# Patient Record
Sex: MALE | Race: WHITE | HISPANIC OR LATINO | Employment: FULL TIME | URBAN - METROPOLITAN AREA
[De-identification: names, ages, dates, MRNs, and addresses within clinical notes are randomized per-mention and may not be internally consistent; named-entity substitution may affect disease eponyms.]

---

## 2017-08-23 ENCOUNTER — ALLSCRIPTS OFFICE VISIT (OUTPATIENT)
Dept: OTHER | Facility: OTHER | Age: 37
End: 2017-08-23

## 2017-08-23 DIAGNOSIS — E66.9 OBESITY: ICD-10-CM

## 2017-08-30 ENCOUNTER — APPOINTMENT (OUTPATIENT)
Dept: LAB | Facility: CLINIC | Age: 37
End: 2017-08-30

## 2017-08-30 DIAGNOSIS — E66.9 OBESITY: ICD-10-CM

## 2017-08-30 LAB
ALBUMIN SERPL BCP-MCNC: 3.9 G/DL (ref 3.5–5)
ALP SERPL-CCNC: 55 U/L (ref 46–116)
ALT SERPL W P-5'-P-CCNC: 42 U/L (ref 12–78)
ANION GAP SERPL CALCULATED.3IONS-SCNC: 8 MMOL/L (ref 4–13)
AST SERPL W P-5'-P-CCNC: 20 U/L (ref 5–45)
BILIRUB SERPL-MCNC: 0.49 MG/DL (ref 0.2–1)
BUN SERPL-MCNC: 14 MG/DL (ref 5–25)
CALCIUM SERPL-MCNC: 9 MG/DL (ref 8.3–10.1)
CHLORIDE SERPL-SCNC: 104 MMOL/L (ref 100–108)
CHOLEST SERPL-MCNC: 182 MG/DL (ref 50–200)
CO2 SERPL-SCNC: 27 MMOL/L (ref 21–32)
CREAT SERPL-MCNC: 0.84 MG/DL (ref 0.6–1.3)
GFR SERPL CREATININE-BSD FRML MDRD: 112 ML/MIN/1.73SQ M
GLUCOSE P FAST SERPL-MCNC: 93 MG/DL (ref 65–99)
HDLC SERPL-MCNC: 35 MG/DL (ref 40–60)
LDLC SERPL CALC-MCNC: 117 MG/DL (ref 0–100)
POTASSIUM SERPL-SCNC: 3.5 MMOL/L (ref 3.5–5.3)
PROT SERPL-MCNC: 7.5 G/DL (ref 6.4–8.2)
SODIUM SERPL-SCNC: 139 MMOL/L (ref 136–145)
TRIGL SERPL-MCNC: 152 MG/DL

## 2017-08-30 PROCEDURE — 36415 COLL VENOUS BLD VENIPUNCTURE: CPT

## 2017-08-30 PROCEDURE — 80053 COMPREHEN METABOLIC PANEL: CPT

## 2017-08-30 PROCEDURE — 80061 LIPID PANEL: CPT

## 2018-01-13 VITALS
HEIGHT: 66 IN | HEART RATE: 78 BPM | BODY MASS INDEX: 30.4 KG/M2 | WEIGHT: 189.15 LBS | DIASTOLIC BLOOD PRESSURE: 80 MMHG | SYSTOLIC BLOOD PRESSURE: 106 MMHG | TEMPERATURE: 97.6 F

## 2018-04-03 RX ORDER — METRONIDAZOLE 10 MG/G
GEL TOPICAL 2 TIMES DAILY
COMMUNITY
Start: 2017-08-23 | End: 2021-01-02

## 2018-04-04 ENCOUNTER — OFFICE VISIT (OUTPATIENT)
Dept: INTERNAL MEDICINE CLINIC | Facility: CLINIC | Age: 38
End: 2018-04-04

## 2018-04-04 VITALS
TEMPERATURE: 97.8 F | SYSTOLIC BLOOD PRESSURE: 112 MMHG | HEIGHT: 66 IN | HEART RATE: 68 BPM | WEIGHT: 182.98 LBS | DIASTOLIC BLOOD PRESSURE: 80 MMHG | BODY MASS INDEX: 29.41 KG/M2

## 2018-04-04 DIAGNOSIS — L71.8 ACNE ROSACEA, PAPULAR TYPE: Primary | ICD-10-CM

## 2018-04-04 DIAGNOSIS — F17.200 TOBACCO DEPENDENCE: Chronic | ICD-10-CM

## 2018-04-04 PROBLEM — L71.9 ROSACEA: Status: ACTIVE | Noted: 2017-08-23

## 2018-04-04 PROCEDURE — 99212 OFFICE O/P EST SF 10 MIN: CPT | Performed by: INTERNAL MEDICINE

## 2018-04-04 RX ORDER — DIAPER,BRIEF,INFANT-TODD,DISP
EACH MISCELLANEOUS 4 TIMES DAILY PRN
COMMUNITY
End: 2018-04-04 | Stop reason: ALTCHOICE

## 2018-04-04 NOTE — ASSESSMENT & PLAN NOTE
Continue with metronidazole cream for skin care  Work on stopping smoking to help skin care  May use sulfacetamide cream as well

## 2018-04-04 NOTE — PROGRESS NOTES
ASSESSMENT/PLAN:  Problem List Items Addressed This Visit        Musculoskeletal and Integument    Acne rosacea, papular type - Primary     Continue with metronidazole cream for skin care  Work on stopping smoking to help skin care  May use sulfacetamide cream as well              Other    Tobacco dependence (Chronic)    Relevant Medications    nicotine polacrilex (NICORETTE) 2 mg gum          Schedule a follow-up appointment as needed  CHIEF COMPLAINT: skin problems    HISTORY OF PRESENT ILLNESS:  Felicitas Reveles is a 40 y o  whom presents to clinic today for complaints of skin rash  He reports that he has been having this issue for the last 10 years, which seems to have started around the time he started smoking  He currently smokes about half of a pack per day  He reports that in august he was prescribed some metronidazole cream for his skin for which he takes on a daily basis, though he has seen some mild improvement in his skin, he does not feel as though he is completely better  He reports that he washes his face every day with water and when he showers he will use Head and Shoulders for his face  He denied any fever or chills, N/v, abd pain, sob, recent travel or recent infections  Review of Systems   Constitutional: Negative for activity change, appetite change, chills, diaphoresis, fatigue and fever  HENT: Negative for congestion  Eyes: Negative for discharge and itching  Respiratory: Negative for apnea, cough, choking, shortness of breath and wheezing  Cardiovascular: Negative for chest pain, palpitations and leg swelling  Gastrointestinal: Negative for abdominal distention, constipation, diarrhea, nausea and vomiting  Endocrine: Negative for cold intolerance and heat intolerance  Musculoskeletal: Negative for arthralgias, back pain and myalgias  Skin: Negative for color change, pallor, rash and wound  Allergic/Immunologic: Negative for environmental allergies and food allergies  Neurological: Negative for dizziness, seizures, numbness and headaches  Hematological: Negative for adenopathy  Psychiatric/Behavioral: Negative for agitation and confusion  OBJECTIVE:  Vitals:    04/04/18 1603   BP: 112/80   BP Location: Right arm   Patient Position: Sitting   Cuff Size: Adult   Pulse: 68   Temp: 97 8 °F (36 6 °C)   TempSrc: Oral   Weight: 83 kg (182 lb 15 7 oz)   Height: 5' 5 5" (1 664 m)     Physical Exam   Constitutional: He appears well-developed and well-nourished  HENT:   Head: Normocephalic and atraumatic  Eyes: Conjunctivae and EOM are normal  Pupils are equal, round, and reactive to light  Right eye exhibits no discharge  Left eye exhibits no discharge  Neck: Normal range of motion  Neck supple  No JVD present  Cardiovascular: Normal rate, regular rhythm, normal heart sounds and intact distal pulses  Exam reveals no gallop and no friction rub  No murmur heard  Pulmonary/Chest: Effort normal and breath sounds normal  No respiratory distress  He has no wheezes  Abdominal: Soft  Bowel sounds are normal  He exhibits no distension  There is no tenderness  Neurological: He is alert  No cranial nerve deficit  Coordination normal    Skin: Skin is warm and dry  Capillary refill takes less than 2 seconds  There is erythema  Psychiatric: He has a normal mood and affect  His behavior is normal    Vitals reviewed  Current Outpatient Prescriptions:     metroNIDAZOLE (METROGEL) 1 % gel, Apply topically Twice daily, Disp: , Rfl:     nicotine polacrilex (NICORETTE) 2 mg gum, Chew 1 each (2 mg total) as needed for smoking cessation, Disp: 100 each, Rfl: 1    History reviewed  No pertinent past medical history    Past Surgical History:   Procedure Laterality Date    NO PAST SURGERIES       Social History     Social History    Marital status: Single     Spouse name: N/A    Number of children: N/A    Years of education: N/A     Occupational History    Not on file      Social History Main Topics    Smoking status: Current Every Day Smoker     Packs/day: 0 50     Years: 10 00    Smokeless tobacco: Never Used    Alcohol use Yes      Comment: pt "holiday"    Drug use: No    Sexual activity: Yes     Partners: Female     Birth control/ protection: Condom Male     Other Topics Concern    Not on file     Social History Narrative    Daily caffeine consumption, 2-3 servings a day    Socially only     Family History   Problem Relation Age of Onset    Diabetes Mother        ==  MD Irina Ortiz 73 Internal Medicine PGY-2    [address] , [address] [address] [facility] [address], [address] [facility]  Office: [phone]  Fax: [phone]

## 2018-08-08 ENCOUNTER — OFFICE VISIT (OUTPATIENT)
Dept: INTERNAL MEDICINE CLINIC | Facility: CLINIC | Age: 38
End: 2018-08-08

## 2018-08-08 VITALS
HEIGHT: 65 IN | HEART RATE: 92 BPM | BODY MASS INDEX: 30.93 KG/M2 | DIASTOLIC BLOOD PRESSURE: 72 MMHG | SYSTOLIC BLOOD PRESSURE: 110 MMHG | WEIGHT: 185.63 LBS | TEMPERATURE: 97.8 F

## 2018-08-08 DIAGNOSIS — B34.9 ACUTE VIRAL SYNDROME: Primary | ICD-10-CM

## 2018-08-08 PROCEDURE — 99213 OFFICE O/P EST LOW 20 MIN: CPT | Performed by: INTERNAL MEDICINE

## 2018-08-08 RX ORDER — FLUTICASONE PROPIONATE 50 MCG
1 SPRAY, SUSPENSION (ML) NASAL DAILY
Qty: 16 G | Refills: 0 | Status: SHIPPED | OUTPATIENT
Start: 2018-08-08 | End: 2021-01-02

## 2018-08-08 NOTE — PATIENT INSTRUCTIONS
You may use the Flonase as directed  You may also try over-the-counter Sudafed  Continue taking Tylenol and Nyquil as needed  Remain hydrated  Rinosinusitis   CUIDADO AMBULATORIO:   La rinosinusitis (RS)  es la inflamación de perera nariz y senos paranasales  Por lo general, comienza ayaka un virus, frecuentemente ayaka un resfriado común  Los virus normalmente bush entre 7 y 8 días y no necesitan tratamiento  Cuando el virus no se mejora por sí solo, usted podría tener rinosinusitis bacteriana  [place] significa que ha comenzado a crecer bacteria dentro de brenda senos paranasales  La RS aguda dura menos de 4 semanas  La RS crónica dura 12 semanas o más  La RS recurrente es cuando usted tiene 4 o más episodios de rinosinusitis en 1 año  Brenda signos y síntomas  podrían empeorar cuando usted se acueste sobre perera espalda o trate de dormir  Puede presentar cualquiera de los siguientes signos o síntomas:  · Congestión nasal y pérdida del sentido del olfato     · Flujo nasal espeso con mucosidad amarilla o verenice     · Sensación de presión o dolor en perera dwain, o dolor de jorge     · Dolor en los dientes o mal aliento     · Dolor o presión en el oído     · Fiebre o tos     · Cansancio  Busque atención médica de inmediato si:   · Usted tiene visión doble o no ve  · Usted tiene el judy rígido, fiebre o un abdiaziz dolor de jorge  · Perera globo ocular sobresale o usted no puede  perera yobany  · Perera yobany y párpado están enrojecidos, inflamados y adoloridos  · Usted no puede abrir perera yobany  · Usted tiene más sueño de lo normal o nota cambios en perera habilidad de pensar, moverse o hablar  · Usted tiene inflamada la frente o el cuero cabelludo  Pregúntele a perera Marvie Backers vitaminas y minerales son adecuados para usted  · Brenda síntomas empeoran o no mejoran después de 3 a 5 días de [place]  · Usted tiene preguntas o inquietudes acerca de perera condición o cuidado    El tratamiento para la rinosinusitis  podría incluir cualquiera de los siguientes:  · El acetaminofén  Kissousa el dolor y baja la fiebre  Está disponible sin receta médica  Pregunte la cantidad y la frecuencia con que debe tomarlos  [address]  El acetaminofén puede causar daño en el hígado cuando no se ml de forma correcta  · AINEs (Analgésicos antiinflamatorios no esteroides) ayaka el ibuprofeno, ayudan a disminuir la inflamación, el dolor y la [place]  Katy medicamento esta disponible con o sin maryam receta médica  Los AINEs pueden causar sangrado estomacal o problemas renales en ciertas personas  Si usted ml un medicamento anticoagulante, siempre pregúntele a almanzar médico si los JEISON son seguros para usted  Siempre isabel la etiqueta de katy medicamento y [place] instrucciones  · Los aerosoles nasales con esteroides  disminuyen la inflamación de almanzar nariz y de los senos paranasales  · Los descongestionantes  reducen la inflamación y despejan la mucosidad de la nariz y los senos paranasales  Los descongestionantes podrían ayudarle a respirar más fácilmente  · Los antihistamínicos  secar la mucosidad en almanzar nariz y [employer] estornudos  · Antibióticos  sirven para tratar maryam infección bacteriana y se podrían necesitar si los síntomas no mejoran o se [place]  · Arjay jas medicamentos ayaka se le haya indicado  Consulte con almanzar médico si usted rigoberto que almanzar medicamento no le está ayudando o si presenta efectos secundarios  Infórmele si es alérgico a cualquier medicamento  Mantenga maryam lista actualizada de los [place], las vitaminas y los productos herbales que ml  Incluya los siguientes datos de los medicamentos: cantidad, frecuencia y motivo de administración  Traiga con usted la lista o los envases de la píldoras a jas citas de seguimiento  Lleve la lista de los medicamentos con usted en cynthia de maryam emergencia  Cuidados personales:   · Enjuague jas senos paranasales    Use un aparato para enjuagar jas fosas nasales con [facility] solución salina (agua salada)  [place] ayudará a diluir la mucosidad en la nariz eliminando el polen y la suciedad  También ayudará a reducir la inflamación para que usted pueda respirar normalmente  Pregunte a almanzar médico con qué frecuencia hacerlo  · Respire vapor  [place] agua en un sartén hasta que salga vapor  Inclínese sobre el cuenco y gavino maryam carpa con maryam toalla dirk  Respire profundamente por aproximadamente 20 minutos  Tenga cuidado de no acercarse demasiado al vapor o de quemarse  Gavino esto 3 veces al día  Usted también puede respirar profundamente mientras ml maryam ducha caliente  · Duerma con la Mardelle Hector  Coloque maryam almohada adicional debajo de almanzar jorge antes de dormir para ayudar a drenar jas senos paranasales  · [address] se le haya indicado  Pregunte a almanzar médico sobre la cantidad de líquido que necesita robert todos los días y cuáles le recomienda  Los líquidos van a diluir la mucosidad en almanzar [facility] y [facility] a drenarla  Evite las bebidas que contienen alcohol o cafeína  · No fume y evite el humo de [place]  La nicotina y otros químicos en los cigarrillos y cigarros pueden empeorar jas síntomas  Pida información a almanzar médico si usted actualmente fuma y necesita ayuda para dejar de fumar  Los cigarrillos electrónicos o tabaco sin humo todavía contienen nicotina  Consulte con almanzar médico antes de [employer]  Acuda a jas consultas de control con almanzar médico según le indicaron  Acuda a maryam luis eduardo seguimiento si jas síntomas empeoran o no se mejoran al cabo de 3 a 5 días de tratamiento  Anote jas preguntas para que se acuerde de hacerlas arminda jas visitas  © 2017 [address] [employer] Information is for End User's use only and may not be sold, redistributed or otherwise used for commercial purposes  All illustrations and images included in CareNotes® are the copyrighted property of [employer]AMES [employer] M , Inc  or Akbar Shields    Esta información es sólo para uso en educación  Almanzar intención no es darle un consejo médico sobre enfermedades o tratamientos  Colsulte con almanzar [employer] farmacéutico antes de seguir cualquier régimen médico para saber si es seguro y efectivo para usted

## 2018-08-08 NOTE — PROGRESS NOTES
ACUTE VISIT NOTE   Gary Campbell   45 y o  male   MRN: [id number]    ASSESSMENT/PLAN:  Diagnoses and all orders for this visit:    1  Acute viral syndrome  · Continue supportive care  · Will trials flonase for congestive symptoms  · Advised patient to also try OTC Sudafed  · Continue Tylenol and Nyquil as needed  · Patient advised to remain hydrated  · Return to clinic as needed for persistent or worsening symptoms  · Fluticasone (FLONASE) 50 mcg/act nasal spray; 1 spray into each nostril daily      Schedule a follow-up appointment as needed if symptoms worsen  Chief Complaint: Flu-like symptoms    History of Present Illness:  45year old male presents to office with acute complaint of flu-like symptoms  Started 3 days ago  Consists of rhinorrhea, sinus congestion, sore throat, cough intermittently productive of clear sputum, subjective fevers  Patient is afebrile in office today  Has tried taking Nyquil without significant relief  Denies any sick contacts  Has not had flu-shot this year  Patient works in [place]  Denies any recent travel  Review of Systems   Constitutional: Positive for fever  HENT: Positive for rhinorrhea, sinus pain, sinus pressure and sore throat  Respiratory: Positive for cough  All other systems reviewed and are negative  Objective:  Vitals:    08/08/18 1633   BP: 110/72   BP Location: Right arm   Patient Position: Sitting   Cuff Size: Adult   Pulse: 92   Temp: 97 8 °F (36 6 °C)   TempSrc: Oral   Weight: 84 2 kg (185 lb 10 oz)   Height: 5' 5" (1 651 m)     Physical Exam   Constitutional: He is oriented to person, place, and time  He appears well-developed and well-nourished  No distress  HENT:   Head: Normocephalic and atraumatic  Nose: Rhinorrhea present  Mouth/Throat: Oropharynx is clear and moist    Eyes: Conjunctivae and EOM are normal  No scleral icterus  Neck: Neck supple  No JVD present  No tracheal deviation present     Cardiovascular: Normal rate, regular rhythm, normal heart sounds and intact distal pulses  Exam reveals no gallop and no friction rub  No murmur heard  Pulmonary/Chest: Effort normal and breath sounds normal  No respiratory distress  He has no wheezes  He has no rales  He exhibits no tenderness  Abdominal: Soft  Bowel sounds are normal  He exhibits no distension  There is no tenderness  There is no rebound and no guarding  Musculoskeletal: Normal range of motion  He exhibits no edema or deformity  Lymphadenopathy:     He has cervical adenopathy  Neurological: He is alert and oriented to person, place, and time  No cranial nerve deficit  Skin: Skin is warm and dry  No rash noted  He is not diaphoretic  No erythema  No pallor  Nursing note and vitals reviewed  Current Outpatient Prescriptions:     metroNIDAZOLE (METROGEL) 1 % gel, Apply topically Twice daily, Disp: , Rfl:     fluticasone (FLONASE) 50 mcg/act nasal spray, 1 spray into each nostril daily, Disp: 16 g, Rfl: 0    nicotine polacrilex (NICORETTE) 2 mg gum, Chew 1 each (2 mg total) as needed for smoking cessation, Disp: 100 each, Rfl: 1    History reviewed  No pertinent past medical history  Past Surgical History:   Procedure Laterality Date    NO PAST SURGERIES       Social History     Social History    Marital status: Single     Spouse name: N/A    Number of children: N/A    Years of education: N/A     Occupational History    Not on file       Social History Main Topics    Smoking status: Current Every Day Smoker     Packs/day: 0 50     Years: 10 00    Smokeless tobacco: Never Used    Alcohol use Yes      Comment: pt "holiday"    Drug use: No    Sexual activity: Yes     Partners: Female     Birth control/ protection: Condom Male     Other Topics Concern    Not on file     Social History Narrative    Daily caffeine consumption, 2-3 servings a day    Socially only     Family History   Problem Relation Age of Onset    Diabetes Mother        ==  Isidro Felipe Bill Smith 15 Internal Medicine PGY-2    [address] [facility] , [facility] [id number] [place] [address] 28, 210 [facility]  Office: [phone]  Fax: [phone]

## 2019-01-30 ENCOUNTER — OFFICE VISIT (OUTPATIENT)
Dept: INTERNAL MEDICINE CLINIC | Facility: CLINIC | Age: 39
End: 2019-01-30

## 2019-01-30 VITALS
BODY MASS INDEX: 31.22 KG/M2 | TEMPERATURE: 98.2 F | SYSTOLIC BLOOD PRESSURE: 134 MMHG | HEIGHT: 65 IN | DIASTOLIC BLOOD PRESSURE: 80 MMHG | WEIGHT: 187.39 LBS | HEART RATE: 96 BPM

## 2019-01-30 DIAGNOSIS — N48.89 NODULE OF SHAFT OF PENIS: Primary | ICD-10-CM

## 2019-01-30 PROCEDURE — 99213 OFFICE O/P EST LOW 20 MIN: CPT | Performed by: INTERNAL MEDICINE

## 2019-01-30 NOTE — PROGRESS NOTES
ASSESSMENT/PLAN:  Problem List Items Addressed This Visit     None      Visit Diagnoses     Nodule of shaft of penis    -  Primary    small nodule noted below the head of the penis  WIll send to urology for eval           Schedule a follow-up appointment 6months as needed    CHIEF COMPLAINT: here for personal complaint    HISTORY OF PRESENT ILLNESS:  Asmita Peterson is a 45 y o  with no significant past medical history whom presents to clinic today for complaints of lesion on the side of his penis  He states that he has had a small nodule on the shaft of his penis for quite some time and the nodule has not gotten any bigger  He denied any drainage or puss from the nodule  He reports that he is sexually active with his girlfriend and that he is uncomfortable with erections because the nodule pulls the skin taught  He would like a urological evaluation  [employer] interpretor Shawn Dang ID# [id number] Assisted with translation throughout examination    Review of Systems   Constitutional: Negative for activity change, appetite change, chills, diaphoresis, fatigue and fever  Eyes: Negative for discharge and itching  Respiratory: Negative for cough, chest tightness, shortness of breath and wheezing  Cardiovascular: Negative for chest pain, palpitations and leg swelling  Gastrointestinal: Negative for abdominal distention, abdominal pain, constipation, diarrhea, nausea and vomiting  Endocrine: Negative for cold intolerance and heat intolerance  Musculoskeletal: Negative for arthralgias, back pain, gait problem and myalgias  Skin: Negative for color change, pallor, rash and wound  Allergic/Immunologic: Negative for environmental allergies and food allergies  Neurological: Negative for dizziness, weakness, light-headedness and headaches  Hematological: Negative for adenopathy  Psychiatric/Behavioral: Negative for agitation, behavioral problems, confusion, decreased concentration and dysphoric mood  OBJECTIVE:  Vitals:    01/30/19 1544   BP: 134/80   Pulse: 96   Temp: 98 2 °F (36 8 °C)   Weight: 85 kg (187 lb 6 3 oz)   Height: 5' 5" (1 651 m)     Physical Exam   Constitutional: He is oriented to person, place, and time  He appears well-developed and well-nourished  No distress  HENT:   Head: Normocephalic and atraumatic  Eyes: Pupils are equal, round, and reactive to light  Conjunctivae and EOM are normal  Right eye exhibits no discharge  Left eye exhibits no discharge  Neck: Normal range of motion  Neck supple  Cardiovascular: Normal rate, regular rhythm and intact distal pulses  Pulmonary/Chest: Effort normal and breath sounds normal    Abdominal: Soft  He exhibits no distension  Genitourinary: Circumcised  Genitourinary Comments: Small nodule noted at just below the head of the penis along the line of circumcision  No erythema, tenderness, fluctuance or warmth  Musculoskeletal: Normal range of motion  He exhibits no edema  Neurological: He is alert and oriented to person, place, and time  No cranial nerve deficit  Skin: Skin is warm and dry  Capillary refill takes less than 2 seconds  No rash noted  He is not diaphoretic  No erythema  No pallor  Psychiatric: He has a normal mood and affect  His behavior is normal    Vitals reviewed  Current Outpatient Prescriptions:     fluticasone (FLONASE) 50 mcg/act nasal spray, 1 spray into each nostril daily, Disp: 16 g, Rfl: 0    metroNIDAZOLE (METROGEL) 1 % gel, Apply topically Twice daily, Disp: , Rfl:     nicotine polacrilex (NICORETTE) 2 mg gum, Chew 1 each (2 mg total) as needed for smoking cessation (Patient not taking: Reported on 1/30/2019 ), Disp: 100 each, Rfl: 1    History reviewed  No pertinent past medical history    Past Surgical History:   Procedure Laterality Date    NO PAST SURGERIES       Social History     Social History    Marital status: Single     Spouse name: N/A    Number of children: N/A    Years of education: N/A     Occupational History    Not on file  Social History Main Topics    Smoking status: Light Tobacco Smoker     Packs/day: 0 50     Years: 10 00    Smokeless tobacco: Never Used    Alcohol use Yes      Comment: pt "holiday"    Drug use: No    Sexual activity: Yes     Partners: Female     Birth control/ protection: Condom Male     Other Topics Concern    Not on file     Social History Narrative    Daily caffeine consumption, 2-3 servings a day    Socially only     Family History   Problem Relation Age of Onset    Diabetes Mother        ==  MD Irina Sierra 73 Internal Medicine PGY-3    [facility]  [address] [address] [facility] , [address] [address] [facility] [address], [address] [facility]  Office: [phone]  Fax: [phone]

## 2019-01-30 NOTE — PATIENT INSTRUCTIONS
Dieta saludable para el corazón   LO QUE NECESITA SABER:   ¿Qué es maryam dieta saludable para Shayla Muskrat? Donnella Darner shireen para el corazón es un plan alimenticio bajo en grasas totales, grasas no saludables y sodio (mando)  Donnella Darner saludable para el corazón ayuda a disminuir almanzar riesgo de sufrir de enfermedades del [place] y un derrame cerebral  Limite la cantidad de grasa que consume entre un 25% a 35% del total de jas calorías diarias  Limite el sodio por debajo de los 2,300 mg al día  ¿Qué es la grasa saludable y dónde se encuentra? Las grasas saludables ayudan a mejorar los niveles de [place]  El riesgo de maryam enfermedad cardíaca se disminuye cuando los niveles de colesterol son normales  Escoja grasas saludables ayaka las siguientes:  · Las grasas no saturadas  se encuentran en alimentos ayaka el frijol de soja, aceites de [place], de [place], de [place] y de [place]  Se encuentra también en la margarina suave hecha con aceite líquido vegetal      · Las grasas Omega 3  se encuentran en ciertos pescados, ayaka el salmón, el atún y la [place], en las nueces y en la semilla de charlye  ¿Qué es maryam grasa perjudicial y dónde se encuentra? Las grasas "malas" pueden causar niveles perjudiciales de colesterol en almanzar gloria y aumentar el riesgo de maryam enfermedad cardíaca  Limite el consumo de grasas no saludables, ayaka los siguientes:  · El colesterol  se encuentra en alimentos de origen animal, ayaka los huevos y la langosta al igual que en productos lácteos hechos con leche entera  Limite el consumo de colesterol a menos de 300 milligramos (mg) al día  Es posible que necesite limitar el colesterol a 200 mg al día si sufre de maryam enfermedad cardíaca  · Las grasas saturadas  se encuentran en jerome ayaka el tocino y la hamburguesa  Estas se encuentran también en la piel del michel y del [place], [place] entera y [place]  Limite el consumo de grasas saturadas a menos del 7% del total de jas calorías diarias   [facility] saturadas a menos del 6% si usted tiene enfermedad cardíaca o tiene un mayor riesgo para esto  · La grasa trans  se encuentran en los alimentos envasados, ayaka las papitas de bolsa y las [place]  También se encuentra en la margarina dura, algunos alimentos fritos y la manteca  Evite lo más que [employer] trans  ¿Qué alimentos o bebidas puedo consumir en maryam dieta saludable para el corazón? Solicite que almanzar nutricionista o el médico le indique cuántas porciones necesita consumir de los siguientes alimentos de cada beatriz alimenticio:  · Granos:      ¨ Panes, cereales y pastas de [place] integral y Mearl Malady integral    ¨ Patatas fritas y galletas saladas bajas en grasa, bajas en sodio    · Verduras:      ¨ Brócoli, judías verdes, guisantes y espinacas    ¨ [place], col y habas    ¨ Zanahorias, patatas dulces, tomates y pimientos    ¨ Vegetales enlatados sin mando añadida    · Frutas:      ¨ Plátanos, melocotones, peras y richter    ¨ Uvas, pasas y dátiles    ¨ [place], [place], [facility], [facility] y [facility]    ¨ [place], [place], melón y papaya    ¨ Hawks y fres    ¨ Conservas de frutas sin azúcares añadidos    · Productos lácteos bajos en grasa:      ¨ Leche sin grasa (descremada), leche 1%, leche baja en grasa de Nancy, anacardo o leche de soja fortificada con calcio    ¨ Queso cottage, queso bajo en grasa y yogur regular o congelado    · Blanche y proteínas , feng de carne [place] de res o cerdo (ileana, nicole capps), el michel y el pavo sin piel, legumbres, productos de soya, las claras del huevo y nueces o maye secos  ¿Cuáles alimentos o bebidas necesito limitar o evitar?   Pregúntele a almanzar médico o nutricionista sobre estos y otros alimentos que contienen altos niveles de alfred Granda y Ramiro que no son saludables:  · [employer] , ayaka las comidas Gerardo Espinoza, robert con queso y cereales con más de 300 mg de sodio por porción    · Corvil o mezclas secas  para pasteles, sopas o salsas    · Verduras con sodio agregada , ayaka las kiran instantáneas, verduras con salsas agregadas o conservas regulares de verduras    · Otros alimentos altos en sodio , ayaka la salsa de [place], salsa de [facility], aderezo para [place], encurtidos de [place], UPPER STONE, salsa de soya y miso    · Alimentos lácteos con alto contenido de grasa  ayaka leche entera o 2%, queso crema o crema agria y quesos     · Alimentos con proteínas altas en grasa  feng de carne (ayaka las [place], las chuletas con hueso), el michel o pavo sin piel y las vísceras de animal ayaka el hígado    · Blanche curadas o [place] , ayaka las salchichas, el tocino y salchichones    · Aceites y grasas poco saludables , ayaka la [place], margarina en Espiridion Kress y aceites para cocinar ayaka el aceite de caitlyn o clement    · Alimentos y bebidas altas en azúcar , tales ayaka refrescos (gaseosas), bebidas deportivas, té azucarado, dulces, pasteles, galletas, tartas y donas  ¿Qué otras pautas para la dieta nura seguir? · Consuma más alimentos que contengan grasas Omega-3  Consuma pescado con altas cantidades de Omega-3 por lo menos 2 veces a la semana  · Limite el consumo de alcohol  Demasiado alcohol puede dañar almanzar corazón y elevar almanzar presión arterial  Las mujeres deberían limitar el consumo de alcohol a 1 bebida por día  Los hombres deberían limitar el consumo de alcohol a 2 tragos al día  Un trago equivale a 12 onzas de cerveza, 5 onzas de vino o 1 onza y ½ de licor  · Escoja alimentos bajos en sodio  Alimentos altos de sodio pueden conducir a la hipertensión  Al preparar la comida añada muy poca sal o no use sal  Use hierbas y especias en vez de la sal     · Consuma más fibra  para ayudar a bajar los niveles de [place]  Consuma al menos 5 porciones de frutas y verduras todos los días  Consuma 3 onzas de alimentos integrales al día  [employer] (henny) son [facility] buena idalia de Genesis    ¿Qué pautas de estilo de maciej debería seguir? · No fume  La nicotina y otros químicos contenidos en los cigarrillos y cigarros pueden causar daño a jas pulmones y el corazón  Pida información a rojo médico si usted actualmente fuma y necesita ayuda para dejar de fumar  Los cigarrillos electrónicos o tabaco sin humo todavía contienen nicotina  Consulte con rojo médico antes de [employer]  · Marisela Peña regularmente  para que le ayude a mantener un peso saludable y mejorar rojo presión arterial y niveles de colesterol  Pregunte a rojo médico acerca del mejor plan de ejercicio para usted  No empiece un programa de ejercicios sin antes consultar con rojo médico    ACUERDOS SOBRE ROJO CUIDADO:   Usted tiene el derecho de ayudar a planear rojo cuidado  Discuta jas opciones de tratamiento con jas médicos para decidir el cuidado que usted desea recibir  Usted siempre tiene el derecho de rechazar el tratamiento  Esta información es sólo para uso en educación  Rojo intención no es darle un consejo médico sobre enfermedades o tratamientos  Colsulte con rojo Freeda Newell farmacéutico antes de seguir cualquier régimen médico para saber si es seguro y efectivo para usted  © 2017 [employer] [employer]  Information is for End User's use only and may not be sold, redistributed or otherwise used for commercial purposes  All illustrations and images included in CareNotes® are the copyrighted property of A D A M , Inc  or Akbar Shields

## 2019-03-05 ENCOUNTER — CONSULT (OUTPATIENT)
Dept: MULTI SPECIALTY CLINIC | Facility: CLINIC | Age: 39
End: 2019-03-05

## 2019-03-05 VITALS
DIASTOLIC BLOOD PRESSURE: 72 MMHG | HEIGHT: 66 IN | BODY MASS INDEX: 29.9 KG/M2 | TEMPERATURE: 97.8 F | WEIGHT: 186.07 LBS | HEART RATE: 64 BPM | SYSTOLIC BLOOD PRESSURE: 104 MMHG

## 2019-03-05 DIAGNOSIS — N48.89 NODULE OF SHAFT OF PENIS: ICD-10-CM

## 2019-03-05 DIAGNOSIS — Z78.9 UNCIRCUMCISED MALE: Primary | ICD-10-CM

## 2019-03-05 DIAGNOSIS — N47.1 PHIMOSIS: ICD-10-CM

## 2019-03-05 PROCEDURE — 99214 OFFICE O/P EST MOD 30 MIN: CPT | Performed by: UROLOGY

## 2019-03-05 RX ORDER — SODIUM CHLORIDE 9 MG/ML
125 INJECTION, SOLUTION INTRAVENOUS CONTINUOUS
Status: CANCELLED | OUTPATIENT
Start: 2019-03-05

## 2019-03-05 NOTE — PROGRESS NOTES
Office note  Assessment:    uncircumcised male    Plan: Will schedule him for circumcision  Seen by Marylee Cirri  Procedure explained, questions answered, informed consent obtained    ______________________________________________________________________    Subjective: This is a the 71-year-old  male with no significant male past no medical history medical history is in clinic today is in clinic today scheduling circumcision     Patient is complaining of having issue with his foreskin  Patient states that his girlfriend is saying Ammon Solis is virgin and needs to have his foreskin removed   Patient is having sex with no issue with ejaculation  He was seen by Medicine in January 2019 for a small nodule below the head of penis  Patient denies any nodule, any ulcer, any STD  He is in the clinic for scheduling his circumcision  No history of phimosis or paraphimosis  Patient is Czech-speaking only, information obtained through in-person  as well as with language line  No medical issues at present, no urological issues      Vitals:  /72 (BP Location: Left arm, Patient Position: Sitting, Cuff Size: Adult)   Pulse 64   Temp 97 8 °F (36 6 °C) (Oral)   Ht 5' 6" (1 676 m)   Wt 84 4 kg (186 lb 1 1 oz)   BMI 30 03 kg/m²       Lab Results and Cultures:   CBC with diff: No results found for: WBC, HGB, HCT, MCV, PLT, ADJUSTEDWBC, MCH, MCHC, RDW, MPV, NRBC,   BMP/CMP:  Lab Results   Component Value Date    K 3 5 08/30/2017     08/30/2017    CO2 27 08/30/2017    BUN 14 08/30/2017    CREATININE 0 84 08/30/2017    CALCIUM 9 0 08/30/2017    AST 20 08/30/2017    ALT 42 08/30/2017    ALKPHOS 55 08/30/2017    EGFR 112 08/30/2017   ,   Lipid Panel: No results found for: CHOL,   Coags: No results found for: PT, PTT, INR,     Blood Culture: No results found for: BLOODCX,   Urinalysis: No results found for: Yarelis Comber, [address], [address], [address] [facility] [address], NITRITE, PROTEINUA, Reyna Sksophia, Murtis Finger,   Urine Culture: No results found for: URINECX,   Wound Culure: No results found for: WOUNDCULT    Medications:  Current Outpatient Medications on File Prior to Visit   Medication Sig Dispense Refill    fluticasone (FLONASE) 50 mcg/act nasal spray 1 spray into each nostril daily 16 g 0    metroNIDAZOLE (METROGEL) 1 % gel Apply topically Twice daily      nicotine polacrilex (NICORETTE) 2 mg gum Chew 1 each (2 mg total) as needed for smoking cessation (Patient not taking: Reported on 1/30/2019 ) 100 each 1     No current facility-administered medications on file prior to visit  Physical Exam:    General:   Alert, oriented to place person and time, cooperative, pleasant  CV:   Regular rate and rhythm  Respiratory:   Clear to auscultation bilaterally  Abdominal:    bowel sound intact, nontender, nondistended  Extremities:   Good range of motion  Genitalia:   Penis:   Retractable foreskin, no lesion, no discharge, no erythema, no phimosis noted    Neurologic:   Grossly intact as tested    Sudha Mcbride PA-C  3/5/2019

## 2019-03-12 ENCOUNTER — TELEPHONE (OUTPATIENT)
Dept: UROLOGY | Facility: AMBULATORY SURGERY CENTER | Age: 39
End: 2019-03-12

## 2019-03-12 NOTE — TELEPHONE ENCOUNTER
03/12/19 Called patient to check if he had an update on insurance to possibly schedule surgery and he said he would call me back at a later date   Darya Mills

## 2019-06-26 NOTE — TELEPHONE ENCOUNTER
06/26/19  Patient was called several times in the last 3 months, he has not returned any calls regarding scheduling surgery

## 2021-01-02 ENCOUNTER — HOSPITAL ENCOUNTER (EMERGENCY)
Facility: HOSPITAL | Age: 41
Discharge: HOME | End: 2021-01-02
Attending: EMERGENCY MEDICINE
Payer: OTHER GOVERNMENT

## 2021-01-02 VITALS
TEMPERATURE: 100.2 F | WEIGHT: 186.95 LBS | BODY MASS INDEX: 30.17 KG/M2 | RESPIRATION RATE: 18 BRPM | DIASTOLIC BLOOD PRESSURE: 85 MMHG | HEART RATE: 91 BPM | OXYGEN SATURATION: 98 % | SYSTOLIC BLOOD PRESSURE: 148 MMHG

## 2021-01-02 DIAGNOSIS — B34.9 VIRAL SYNDROME: ICD-10-CM

## 2021-01-02 DIAGNOSIS — M54.9 BACK PAIN: Primary | ICD-10-CM

## 2021-01-02 PROCEDURE — U0003 INFECTIOUS AGENT DETECTION BY NUCLEIC ACID (DNA OR RNA); SEVERE ACUTE RESPIRATORY SYNDROME CORONAVIRUS 2 (SARS-COV-2) (CORONAVIRUS DISEASE [COVID-19]), AMPLIFIED PROBE TECHNIQUE, MAKING USE OF HIGH THROUGHPUT TECHNOLOGIES AS DESCRIBED BY CMS-2020-01-R: HCPCS | Performed by: EMERGENCY MEDICINE

## 2021-01-02 PROCEDURE — 99284 EMERGENCY DEPT VISIT MOD MDM: CPT | Performed by: EMERGENCY MEDICINE

## 2021-01-02 PROCEDURE — 96372 THER/PROPH/DIAG INJ SC/IM: CPT

## 2021-01-02 PROCEDURE — 99283 EMERGENCY DEPT VISIT LOW MDM: CPT

## 2021-01-02 RX ORDER — KETOROLAC TROMETHAMINE 30 MG/ML
30 INJECTION, SOLUTION INTRAMUSCULAR; INTRAVENOUS ONCE
Status: COMPLETED | OUTPATIENT
Start: 2021-01-02 | End: 2021-01-02

## 2021-01-02 RX ORDER — LIDOCAINE 50 MG/G
1 PATCH TOPICAL ONCE
Status: DISCONTINUED | OUTPATIENT
Start: 2021-01-02 | End: 2021-01-02 | Stop reason: HOSPADM

## 2021-01-02 RX ADMIN — LIDOCAINE 1 PATCH: 50 PATCH TOPICAL at 14:10

## 2021-01-02 RX ADMIN — KETOROLAC TROMETHAMINE 30 MG: 30 INJECTION, SOLUTION INTRAMUSCULAR at 14:10

## 2021-01-02 NOTE — Clinical Note
Home Duran was seen and treated in our emergency department on 1/2/2021  Diagnosis:     Jesus Ford  may return to work on return date  He may return on this date: 01/06/2021         If you have any questions or concerns, please don't hesitate to call        Kaushik Salmeron MD    ______________________________           _______________          _______________  Hospital Representative                              Date                                Time

## 2021-01-02 NOTE — ED PROVIDER NOTES
Final Diagnosis:  1  Back pain    2  Viral syndrome        Chief Complaint   Patient presents with    Back Pain     Patient reports he lifted something heavy 3 days ago and started having back pain   Headache     Patient reports headache and fever starting 3 days ago as well  HPI  Patient presents for evaluation of back pain, headache, and fever  He states that 3 days ago he was at work where he pains and he lifted up a heavy box and started to have mid back pain  This has continued over the past 2-3 days  It is worse with movement  He has taken Tylenol with minor improvement of his pain  He states that he has had back pain like this before that just eventually self resolved  He denies any IV drug use, trauma to his back, bladder bowel incontinence, saddle anesthesia, difficulty with ambulation  Patient also states that he has had a minor headache as well as occasional fever during this time  He is unsure of any sick contacts  Endorses occasional cough which is nonproductive  Occasionally smokes  No other drug use  - No language barrier    - History obtained from patient  - There are no limitations to the history obtained  - Previous charting was reviewed    PMH:   has no past medical history on file  PSH:   has a past surgical history that includes No past surgeries  ROS:  Review of Systems   Constitutional: Positive for fever  Negative for activity change, chills and fatigue  Respiratory: Positive for cough  Negative for shortness of breath  Cardiovascular: Negative for chest pain and palpitations  Gastrointestinal: Negative for abdominal distention, abdominal pain, constipation, diarrhea, nausea and vomiting  Genitourinary: Negative for dysuria and hematuria  Musculoskeletal: Positive for back pain  Negative for arthralgias, myalgias and neck pain  Neurological: Positive for headaches  Negative for dizziness, syncope and light-headedness     All other systems reviewed and are negative  PE:   Vitals:    01/02/21 1351   BP: 148/85   BP Location: Right arm   Pulse: 91   Resp: 18   Temp: 100 2 °F (37 9 °C)   TempSrc: Oral   SpO2: 98%   Weight: 84 8 kg (186 lb 15 2 oz)     Vitals reviewed by me  Physical Exam  Vitals signs reviewed  Constitutional:       General: He is not in acute distress  Appearance: He is well-developed  He is not diaphoretic  HENT:      Head: Normocephalic and atraumatic  Right Ear: External ear normal       Left Ear: External ear normal    Eyes:      General:         Right eye: No discharge  Left eye: No discharge  Conjunctiva/sclera: Conjunctivae normal       Pupils: Pupils are equal, round, and reactive to light  Neck:      Musculoskeletal: Normal range of motion and neck supple  Vascular: No JVD  Trachea: No tracheal deviation  Cardiovascular:      Rate and Rhythm: Normal rate and regular rhythm  Heart sounds: Normal heart sounds  No murmur  No friction rub  No gallop  Pulmonary:      Effort: Pulmonary effort is normal  No respiratory distress  Breath sounds: Normal breath sounds  No wheezing or rales  Abdominal:      General: Bowel sounds are normal  There is no distension  Palpations: Abdomen is soft  There is no mass  Tenderness: There is no abdominal tenderness  There is no guarding  Musculoskeletal: Normal range of motion  General: Tenderness present  No deformity  Thoracic back: He exhibits tenderness  He exhibits normal range of motion, no bony tenderness, no deformity and no spasm  Back:       Comments: No midline tenderness, step-offs, or deformities  Mild paraspinal tenderness with palpation  Neurological:      Mental Status: He is alert and oriented to person, place, and time  Cranial Nerves: No cranial nerve deficit  Sensory: No sensory deficit  Motor: No abnormal muscle tone        Coordination: Coordination normal  Comments: GCS 15  Sensation grossly intact  No cranial nerve deficits noted  5/5 strength bilateral upper and lower extremities  Finger-to-nose good  Heel to shin good  No pronator drift noted  Was able to ambulate without difficulty  Psychiatric:         Behavior: Behavior normal          Thought Content: Thought content normal          Judgment: Judgment normal           A:  - Nursing note reviewed  -                      No orders to display     Orders Placed This Encounter   Procedures    Novel Coronavirus ([id number]), PCR [facility]     Labs Reviewed - No data to display      Final Diagnosis:  1  Back pain    2  Viral syndrome        P:  - Toradol, Lidoderm, coronavirus testing  -instructed to stay home until receiving results from coronavirus testing  -return precautions discussed    Medications   lidocaine (LIDODERM) 5 % patch 1 patch (1 patch Topical Medication Applied 1/2/21 1410)   ketorolac (TORADOL) injection 30 mg (30 mg Intramuscular Given 1/2/21 1410)     Time reflects when diagnosis was documented in both MDM as applicable and the Disposition within this note     Time User Action Codes Description Comment    1/2/2021  2:04 PM Isidro Rushing Add [M54 9] Back pain     1/2/2021  2:04 PM Isidro Rushing Add [B34 9] Viral syndrome       ED Disposition     ED Disposition Condition Date/Time Comment    Discharge Stable Sat Jan 2, 2021  2:04 PM Marycruz Stacy discharge to home/self care              Follow-up Information     Follow up With Specialties Details Why Contact Info Additional MD Ian [address] [facility] [address]  [phone]       [address] [place] [address] Emergency Department Emergency Medicine  If symptoms worsen [address]  [phone]  ED, [address] [place] [address], [address]   [phone]        Patient's Medications   Discharge Prescriptions    No medications on file     No discharge procedures on file  None       Portions of the record may have been created with voice recognition software  Occasional wrong word or "sound a like" substitutions may have occurred due to the inherent limitations of voice recognition software  Read the chart carefully and recognize, using context, where substitutions have occurred      Electronically signed by:  Torres Del Angel, PGY 3, MD Litzy Cintron MD  01/02/21 9643

## 2021-01-02 NOTE — ED ATTENDING ATTESTATION
1/2/2021  IDanay MD, saw and evaluated the patient  I have discussed the patient with the resident/non-physician practitioner and agree with the resident's/non-physician practitioner's findings, Plan of Care, and MDM as documented in the resident's/non-physician practitioner's note, except where noted  All available labs and Radiology studies were reviewed  I was present for key portions of any procedure(s) performed by the resident/non-physician practitioner and I was immediately available to provide assistance  At this point I agree with the current assessment done in the Emergency Department  I have conducted an independent evaluation of this patient a history and physical is as follows:    ED Course         Critical Care Time  Procedures    35 yo male with low back pain started after lifting something few days ago  Pt with no numbness, tingling, weakness into legs  Pt with hx of similar pain, no pain meds at home  Pt with no trouble ambulating  Pt also concerned for covid and having low grade fevers, no cough, mild headache  No other pmh  Vss, afebrile, lungs cta, rrr, abdomen soft nontender, paraspinal lumbar tenderness  msk pain, pain meds, covid swab

## 2021-01-04 LAB — SARS-COV-2 RNA SPEC QL NAA+PROBE: DETECTED

## 2021-01-05 ENCOUNTER — HOSPITAL ENCOUNTER (EMERGENCY)
Facility: HOSPITAL | Age: 41
Discharge: HOME | End: 2021-01-05
Attending: EMERGENCY MEDICINE | Admitting: EMERGENCY MEDICINE
Payer: OTHER GOVERNMENT

## 2021-01-05 VITALS
RESPIRATION RATE: 20 BRPM | TEMPERATURE: 98.4 F | WEIGHT: 187.83 LBS | OXYGEN SATURATION: 97 % | BODY MASS INDEX: 30.32 KG/M2 | DIASTOLIC BLOOD PRESSURE: 66 MMHG | HEART RATE: 87 BPM | SYSTOLIC BLOOD PRESSURE: 160 MMHG

## 2021-01-05 DIAGNOSIS — M54.9 BACK PAIN: ICD-10-CM

## 2021-01-05 DIAGNOSIS — U07.1 COVID-19: Primary | ICD-10-CM

## 2021-01-05 DIAGNOSIS — R52 BODY ACHES: ICD-10-CM

## 2021-01-05 PROCEDURE — 96374 THER/PROPH/DIAG INJ IV PUSH: CPT

## 2021-01-05 PROCEDURE — 99284 EMERGENCY DEPT VISIT MOD MDM: CPT | Performed by: PHYSICIAN ASSISTANT

## 2021-01-05 PROCEDURE — 99283 EMERGENCY DEPT VISIT LOW MDM: CPT

## 2021-01-05 RX ORDER — IBUPROFEN 600 MG/1
600 TABLET ORAL EVERY 6 HOURS PRN
Qty: 30 TABLET | Refills: 0 | Status: SHIPPED | OUTPATIENT
Start: 2021-01-05

## 2021-01-05 RX ORDER — KETOROLAC TROMETHAMINE 30 MG/ML
15 INJECTION, SOLUTION INTRAMUSCULAR; INTRAVENOUS ONCE
Status: COMPLETED | OUTPATIENT
Start: 2021-01-05 | End: 2021-01-05

## 2021-01-05 RX ORDER — ACETAMINOPHEN 500 MG
500 TABLET ORAL EVERY 6 HOURS PRN
Qty: 30 TABLET | Refills: 0 | Status: SHIPPED | OUTPATIENT
Start: 2021-01-05

## 2021-01-05 RX ADMIN — KETOROLAC TROMETHAMINE 15 MG: 30 INJECTION, SOLUTION INTRAMUSCULAR at 09:53

## 2021-01-05 NOTE — DISCHARGE INSTRUCTIONS
Please refer to the attached information for strict return instructions  If symptoms worsen or new symptoms develop please return to the ER  Drink plenty of water to stay hydrated  Please follow up with your primary care physician  You must quarantine at home until at least 3 days (72 hours) after symptoms improve  Use prescribed medications as indicated if needed for fever

## 2021-01-05 NOTE — ED PROVIDER NOTES
History  Chief Complaint   Patient presents with    Fever - 9 weeks to 74 years     pt reports headache and fever that started 6 days ago;      Felix Reid is a 37 yo M, with diagnosis of COVID-19 on 1/2/21, presenting with persistent fevers, body aches, and headache since symptom onset 6 days ago  Reports symptoms are unchanged from previous visit  Reports subjective fevers which wax/wane and seem to improve with tylenol but return within 4-6 hours  Also notes mild nonproductive cough and sore throat  Denies shortness of breath or chest pain  Denies N/VD  Has been eating/drinking normally  History provided by:  Patient   used: No    Fever - 9 weeks to 74 years  Temp source:  Subjective  Duration:  6 days  Timing:  Intermittent  Progression:  Waxing and waning  Chronicity:  New  Relieved by:  Acetaminophen  Associated symptoms: chills, cough, headaches, myalgias and sore throat    Associated symptoms: no chest pain, no congestion, no diarrhea, no dysuria, no ear pain, no nausea, no rash, no rhinorrhea and no vomiting    Risk factors: no recent travel and no sick contacts        None       History reviewed  No pertinent past medical history  Past Surgical History:   Procedure Laterality Date    NO PAST SURGERIES         Family History   Problem Relation Age of Onset    Diabetes Mother      I have reviewed and agree with the history as documented  E-Cigarette/Vaping    E-Cigarette Use Never User      E-Cigarette/Vaping Substances    Nicotine No     THC No     CBD No     Flavoring No     Other No     Unknown No      Social History     Tobacco Use    Smoking status: Light Tobacco Smoker     Packs/day: 0 50     Years: 10 00     Pack years: 5 00    Smokeless tobacco: Never Used   Substance Use Topics    Alcohol use: Yes     Comment: pt "holiday"    Drug use: No       Review of Systems   Constitutional: Positive for chills and fever  Negative for appetite change     HENT: Positive for sore throat  Negative for congestion, ear pain and rhinorrhea  Eyes: Negative for pain and visual disturbance  Respiratory: Positive for cough  Negative for chest tightness, shortness of breath and wheezing  Cardiovascular: Negative for chest pain and palpitations  Gastrointestinal: Negative for abdominal pain, diarrhea, nausea and vomiting  Genitourinary: Negative for dysuria, frequency and urgency  Musculoskeletal: Positive for myalgias  Negative for back pain, neck pain and neck stiffness  Skin: Negative for rash and wound  Neurological: Positive for headaches  Negative for dizziness, weakness, light-headedness and numbness  Physical Exam  Physical Exam  Constitutional:       General: He is not in acute distress  Appearance: He is well-developed  He is not toxic-appearing or diaphoretic  HENT:      Head: Normocephalic and atraumatic  Right Ear: External ear normal       Left Ear: External ear normal    Eyes:      Conjunctiva/sclera: Conjunctivae normal       Pupils: Pupils are equal, round, and reactive to light  Neck:      Musculoskeletal: Normal range of motion and neck supple  Cardiovascular:      Rate and Rhythm: Normal rate and regular rhythm  Heart sounds: Normal heart sounds  No murmur  No friction rub  No gallop  Pulmonary:      Effort: Pulmonary effort is normal  No respiratory distress  Breath sounds: Normal breath sounds  No wheezing  Abdominal:      General: There is no distension  Palpations: Abdomen is soft  Tenderness: There is no abdominal tenderness  Lymphadenopathy:      Cervical: No cervical adenopathy  Skin:     General: Skin is warm and dry  Capillary Refill: Capillary refill takes less than 2 seconds  Findings: No erythema or rash  Neurological:      Mental Status: He is alert and oriented to person, place, and time  Motor: No abnormal muscle tone        Coordination: Coordination normal    Psychiatric: Behavior: Behavior normal          Thought Content: Thought content normal          Judgment: Judgment normal          Vital Signs  ED Triage Vitals   Temperature Pulse Respirations Blood Pressure SpO2   01/05/21 0902 01/05/21 0902 01/05/21 0902 01/05/21 0932 01/05/21 0902   98 4 °F (36 9 °C) 87 20 160/66 97 %      Temp Source Heart Rate Source Patient Position - Orthostatic VS BP Location FiO2 (%)   01/05/21 0902 01/05/21 0902 -- -- --   Temporal Monitor         Pain Score       01/05/21 0953       8           Vitals:    01/05/21 0902 01/05/21 0932   BP:  160/66   Pulse: 87          Visual Acuity      ED Medications  Medications   ketorolac (TORADOL) injection 15 mg (15 mg Intravenous Given 1/5/21 9969)       Diagnostic Studies  Results Reviewed     None                 No orders to display              Procedures  Procedures         ED Course                                           MDM  Number of Diagnoses or Management Options  Back pain:   Body aches:   COVID-19:   Diagnosis management comments: Persistent COVID-19 symptoms after diagnosis on 1/2/21  Notes waxing/waning fevers which improve with tylenol but do return  No dyspnea/chest pain  Pt is well appearing, nontoxic, afebrile here and appears well hydrated  Symptoms largely unchanged today  Discussed course of illness and tylenol/ibuprofen dosing for fever management  Given toradol here for symptomatic relief  At home quarantine instructions and return indications discussed  Plenty of fluids encouraged  Pt advised to call his PCP to arrange follow up         Amount and/or Complexity of Data Reviewed  Clinical lab tests: reviewed    Patient Progress  Patient progress: stable      Disposition  Final diagnoses:   COVID-19   Body aches   Back pain     Time reflects when diagnosis was documented in both MDM as applicable and the Disposition within this note     Time User Action Codes Description Comment    1/5/2021 10:12 AM Killian Duran Add [U07 1] COVID-19     1/5/2021 10:12 AM Anola Pila Add [R52] Body aches     1/5/2021 10:12 AM Anola Pila Add [M54 9] Back pain       ED Disposition     ED Disposition Condition Date/Time Comment    Discharge Stable Tue Jan 5, 2021 10:12 AM Russ Stacy discharge to home/self care  Follow-up Information     Follow up With Specialties Details Why Contact Info Additional MD Ian Palliative [facility] Call   [address]  [address] [place] [address] Emergency Department Emergency Medicine  If symptoms worsen [facility] [id number]  [phone] [place] ED, [address] [facility] , [place], [place], [address]          Discharge Medication List as of 1/5/2021 10:16 AM      START taking these medications    Details   acetaminophen (TYLENOL) 500 mg tablet Take 1 tablet (500 mg total) by mouth every 6 (six) hours as needed for mild pain, moderate pain or fever, Starting Tue 1/5/2021, Normal      dextromethorphan-guaifenesin (MUCINEX DM)  MG per 12 hr tablet Take 1 tablet by mouth every 12 (twelve) hours as needed for cough, Starting Tue 1/5/2021, Normal      ibuprofen (MOTRIN) 600 mg tablet Take 1 tablet (600 mg total) by mouth every 6 (six) hours as needed for mild pain, moderate pain or fever, Starting Tue 1/5/2021, Normal           No discharge procedures on file      PDMP Review     None          ED Provider  Electronically Signed by           Ronan Galvan PA-C  01/05/21 1500

## 2021-01-05 NOTE — Clinical Note
Deepthi Davis was seen and treated in our emergency department on 1/5/2021  Diagnosis:     [place]    He may return on this date: May return to work at least 3 days (72 hours) after symptoms improve AND at least 10 days after symptoms first started (whichever is later)     If you have any questions or concerns, please don't hesitate to call        Devaughn Remy PA-C    ______________________________           _______________          _______________  Hospital Representative                              Date                                Time

## 2021-08-18 ENCOUNTER — HOSPITAL ENCOUNTER (EMERGENCY)
Facility: HOSPITAL | Age: 41
Discharge: HOME | End: 2021-08-19
Attending: EMERGENCY MEDICINE | Admitting: EMERGENCY MEDICINE

## 2021-08-18 VITALS
BODY MASS INDEX: 30.18 KG/M2 | TEMPERATURE: 98.1 F | DIASTOLIC BLOOD PRESSURE: 81 MMHG | HEART RATE: 80 BPM | SYSTOLIC BLOOD PRESSURE: 123 MMHG | OXYGEN SATURATION: 98 % | WEIGHT: 187 LBS | RESPIRATION RATE: 16 BRPM

## 2021-08-18 DIAGNOSIS — M54.50 LOW BACK PAIN: Primary | ICD-10-CM

## 2021-08-18 LAB
ALBUMIN SERPL BCP-MCNC: 3.5 G/DL (ref 3.5–5)
ALP SERPL-CCNC: 72 U/L (ref 46–116)
ALT SERPL W P-5'-P-CCNC: 43 U/L (ref 12–78)
ANION GAP SERPL CALCULATED.3IONS-SCNC: 5 MMOL/L (ref 4–13)
AST SERPL W P-5'-P-CCNC: 23 U/L (ref 5–45)
BASOPHILS # BLD AUTO: 0.06 THOUSANDS/ΜL (ref 0–0.1)
BASOPHILS NFR BLD AUTO: 1 % (ref 0–1)
BILIRUB SERPL-MCNC: 0.31 MG/DL (ref 0.2–1)
BILIRUB UR QL STRIP: NEGATIVE
BUN SERPL-MCNC: 16 MG/DL (ref 5–25)
CALCIUM SERPL-MCNC: 7.9 MG/DL (ref 8.3–10.1)
CHLORIDE SERPL-SCNC: 109 MMOL/L (ref 100–108)
CLARITY UR: CLEAR
CO2 SERPL-SCNC: 25 MMOL/L (ref 21–32)
COLOR UR: YELLOW
CREAT SERPL-MCNC: 0.86 MG/DL (ref 0.6–1.3)
EOSINOPHIL # BLD AUTO: 0.3 THOUSAND/ΜL (ref 0–0.61)
EOSINOPHIL NFR BLD AUTO: 5 % (ref 0–6)
ERYTHROCYTE [DISTWIDTH] IN BLOOD BY AUTOMATED COUNT: 13.3 % (ref 11.6–15.1)
GFR SERPL CREATININE-BSD FRML MDRD: 108 ML/MIN/1.73SQ M
GLUCOSE SERPL-MCNC: 125 MG/DL (ref 65–140)
GLUCOSE UR STRIP-MCNC: NEGATIVE MG/DL
HCT VFR BLD AUTO: 42.1 % (ref 36.5–49.3)
HGB BLD-MCNC: 14.9 G/DL (ref 12–17)
HGB UR QL STRIP.AUTO: NEGATIVE
IMM GRANULOCYTES # BLD AUTO: 0.05 THOUSAND/UL (ref 0–0.2)
IMM GRANULOCYTES NFR BLD AUTO: 1 % (ref 0–2)
KETONES UR STRIP-MCNC: NEGATIVE MG/DL
LEUKOCYTE ESTERASE UR QL STRIP: NEGATIVE
LIPASE SERPL-CCNC: 140 U/L (ref 73–393)
LYMPHOCYTES # BLD AUTO: 1.96 THOUSANDS/ΜL (ref 0.6–4.47)
LYMPHOCYTES NFR BLD AUTO: 31 % (ref 14–44)
MCH RBC QN AUTO: 31.6 PG (ref 26.8–34.3)
MCHC RBC AUTO-ENTMCNC: 35.4 G/DL (ref 31.4–37.4)
MCV RBC AUTO: 89 FL (ref 82–98)
MONOCYTES # BLD AUTO: 0.42 THOUSAND/ΜL (ref 0.17–1.22)
MONOCYTES NFR BLD AUTO: 7 % (ref 4–12)
NEUTROPHILS # BLD AUTO: 3.62 THOUSANDS/ΜL (ref 1.85–7.62)
NEUTS SEG NFR BLD AUTO: 55 % (ref 43–75)
NITRITE UR QL STRIP: NEGATIVE
NRBC BLD AUTO-RTO: 0 /100 WBCS
PH UR STRIP.AUTO: 7 [PH] (ref 4.5–8)
PLATELET # BLD AUTO: 188 THOUSANDS/UL (ref 149–390)
PMV BLD AUTO: 10 FL (ref 8.9–12.7)
POTASSIUM SERPL-SCNC: 3.8 MMOL/L (ref 3.5–5.3)
PROT SERPL-MCNC: 7 G/DL (ref 6.4–8.2)
PROT UR STRIP-MCNC: NEGATIVE MG/DL
RBC # BLD AUTO: 4.71 MILLION/UL (ref 3.88–5.62)
SODIUM SERPL-SCNC: 139 MMOL/L (ref 136–145)
SP GR UR STRIP.AUTO: 1.02 (ref 1–1.03)
UROBILINOGEN UR QL STRIP.AUTO: 1 E.U./DL
WBC # BLD AUTO: 6.41 THOUSAND/UL (ref 4.31–10.16)

## 2021-08-18 PROCEDURE — 99284 EMERGENCY DEPT VISIT MOD MDM: CPT

## 2021-08-18 PROCEDURE — 81003 URINALYSIS AUTO W/O SCOPE: CPT

## 2021-08-18 PROCEDURE — 85025 COMPLETE CBC W/AUTO DIFF WBC: CPT | Performed by: EMERGENCY MEDICINE

## 2021-08-18 PROCEDURE — 80053 COMPREHEN METABOLIC PANEL: CPT | Performed by: EMERGENCY MEDICINE

## 2021-08-18 PROCEDURE — 36415 COLL VENOUS BLD VENIPUNCTURE: CPT | Performed by: EMERGENCY MEDICINE

## 2021-08-18 PROCEDURE — 99285 EMERGENCY DEPT VISIT HI MDM: CPT | Performed by: EMERGENCY MEDICINE

## 2021-08-18 PROCEDURE — 96361 HYDRATE IV INFUSION ADD-ON: CPT

## 2021-08-18 PROCEDURE — 83690 ASSAY OF LIPASE: CPT | Performed by: EMERGENCY MEDICINE

## 2021-08-18 PROCEDURE — 96374 THER/PROPH/DIAG INJ IV PUSH: CPT

## 2021-08-18 RX ORDER — LIDOCAINE 50 MG/G
1 PATCH TOPICAL ONCE
Status: DISCONTINUED | OUTPATIENT
Start: 2021-08-18 | End: 2021-08-19 | Stop reason: HOSPADM

## 2021-08-18 RX ORDER — SODIUM CHLORIDE, SODIUM GLUCONATE, SODIUM ACETATE, POTASSIUM CHLORIDE, MAGNESIUM CHLORIDE, SODIUM PHOSPHATE, DIBASIC, AND POTASSIUM PHOSPHATE .53; .5; .37; .037; .03; .012; .00082 G/100ML; G/100ML; G/100ML; G/100ML; G/100ML; G/100ML; G/100ML
1000 INJECTION, SOLUTION INTRAVENOUS ONCE
Status: DISCONTINUED | OUTPATIENT
Start: 2021-08-18 | End: 2021-08-18

## 2021-08-18 RX ORDER — KETOROLAC TROMETHAMINE 30 MG/ML
15 INJECTION, SOLUTION INTRAMUSCULAR; INTRAVENOUS ONCE
Status: COMPLETED | OUTPATIENT
Start: 2021-08-18 | End: 2021-08-18

## 2021-08-18 RX ADMIN — SODIUM CHLORIDE 1000 ML: 0.9 INJECTION, SOLUTION INTRAVENOUS at 20:46

## 2021-08-18 RX ADMIN — LIDOCAINE 1 PATCH: 50 PATCH TOPICAL at 20:25

## 2021-08-18 RX ADMIN — KETOROLAC TROMETHAMINE 15 MG: 30 INJECTION, SOLUTION INTRAMUSCULAR; INTRAVENOUS at 20:25

## 2021-08-18 NOTE — Clinical Note
Antoine Batista was seen and treated in our emergency department on 8/18/2021  Diagnosis:     [place]  may return to work on return date  He may return on this date: 08/20/2021    Or sooner     If you have any questions or concerns, please don't hesitate to call        Ann Valenzuela MD    ______________________________           _______________          _______________  Hospital Representative                              Date                                Time

## 2021-08-18 NOTE — Clinical Note
Han Saxena was seen and treated in our emergency department on 8/18/2021  Diagnosis:     Gunner Gleason  may return to work on return date  He may return on this date: 08/20/2021    Or sooner     If you have any questions or concerns, please don't hesitate to call        Taniya Champagne MD    ______________________________           _______________          _______________  Hospital Representative                              Date                                Time

## 2021-08-18 NOTE — Clinical Note
Branden Alvares was seen and treated in our emergency department on 8/18/2021  Diagnosis:     Jihan Urias  may return to work on return date  He may return on this date: 08/20/2021    Or sooner     If you have any questions or concerns, please don't hesitate to call        Annita Saavedra MD    ______________________________           _______________          _______________  Hospital Representative                              Date                                Time

## 2021-08-18 NOTE — Clinical Note
Viki Arce was seen and treated in our emergency department on 8/18/2021  Diagnosis:     Haroldo Meneses  may return to work on return date  He may return on this date: 08/20/2021    Or sooner     If you have any questions or concerns, please don't hesitate to call        Rita Hoyos MD    ______________________________           _______________          _______________  Hospital Representative                              Date                                Time

## 2021-08-19 ENCOUNTER — TELEPHONE (OUTPATIENT)
Dept: PHYSICAL THERAPY | Facility: OTHER | Age: 41
End: 2021-08-19

## 2021-08-19 NOTE — ED ATTENDING ATTESTATION
8/18/2021  ISaturnino DO, saw and evaluated the patient  I have discussed the patient with the resident/non-physician practitioner and agree with the resident's/non-physician practitioner's findings, Plan of Care, and MDM as documented in the resident's/non-physician practitioner's note, except where noted  All available labs and Radiology studies were reviewed  I was present for key portions of any procedure(s) performed by the resident/non-physician practitioner and I was immediately available to provide assistance  At this point I agree with the current assessment done in the Emergency Department  I have conducted an independent evaluation of this patient a history and physical is as follows:    60-year-old male presents with left flank pain for 1 week  Pain worse with movement  Denies urinary complaints, no nausea or vomiting, no fevers  Denies injury  On exam-no acute distress, heart regular, no respiratory distress, mild tenderness in the left paraspinal lumbar area    Plan-check urine, check labs, bedside ultrasound, treat pain    ED Course         Critical Care Time  Procedures

## 2021-08-19 NOTE — TELEPHONE ENCOUNTER
Nurse reached out to discuss recent referral entered for  Comprehensive Spine program and offerings  Patient declined to participate in the program at this time d/t not having current [employer]  Nurse provided pt with the number to PT site so he can call for non-insured rate  Patient appreciative of call and information      Nurse wished him well and referral closed per protocol

## 2021-08-19 NOTE — DISCHARGE INSTRUCTIONS
Instrucciones para el paciente: Hoy lo atendieron en el departamento de emergencias por dolor lumbar  Revisamos almanzar orina y jas análisis de laboratorio y determinamos que podría ser dado de marilyn  Debe robert ibuprofeno y tylenol según sea necesario para almanzar dolor  Debe hacer un seguimiento con un equipo de columna integral     Regrese al departamento de emergencias si jas síntomas empeoran, tiene fiebre o tiene cualquier otro síntoma que discutimos hoy antes del marilyn  ¡Un placer conocerte! ¡La mejor de las suertes en todo!

## 2021-08-19 NOTE — ED PROVIDER NOTES
Final Diagnosis:  1  Low back pain         Chief Complaint   Patient presents with    Flank Pain     pt c/o left sided flank pain for 1 week  ASSESSMENT + PLAN:   - Nursing note reviewed  1  L low back pain  -No red flags, patient believes this is kidney pain though  -Labs, urine, IV analgesia, reassess  -Labs, urine negative  -POCT renal u/s negative (not recorded)  Procedure  POC Renal US    Date/Time: 8/18/2021 8:45 PM  Performed by: Rita Hoyos MD  Authorized by: Rita Hoyos MD     Patient location:  ED  Performed by:  Resident  Procedure details:     Exam Type:  Diagnostic    Indications: flank/back pain      Assessment for:  Suspected hydronephrosis    Image quality: diagnostic      Image availability:  Not saved  Findings:     LEFT kidney findings: unremarkable      LEFT hydronephrosis: none      RIGHT kidney findings: unremarkable      RIGHT hydronephrosis: none      Bladder:  Visualized  Interpretation:     Renal ultrasound impressions: normal exam                      Final Dispo   Patient was reassessed  Vital signs stable  Patient and/or family given discharge instructions and return precautions  Patient and/or family was reassured  The patient and/or family vocalizes understanding  Answered all of the patient's and/or family's questions  Will follow up with PCP  Patient and/or family are agreeable to the plan  Medications   ketorolac (TORADOL) injection 15 mg (15 mg Intravenous Given 8/18/21 2025)   sodium chloride 0 9 % bolus 1,000 mL (0 mL Intravenous Stopped 8/19/21 0115)     Time reflects when diagnosis was documented in both MDM as applicable and the Disposition within this note     Time User Action Codes Description Comment    8/18/2021  9:55 PM Gabbi Crooks Add [M54 5] Low back pain       ED Disposition     ED Disposition Condition Date/Time Comment    Discharge Stable Wed Aug 18, 2021  9:55 PM Haroldo Stacy discharge to home/self care              Follow-up Information Follow up With Specialties Details Why [id number]os MD Palliative Care Call   [facility]  [phone]          Discharge Medication List as of 8/18/2021 10:25 PM      CONTINUE these medications which have NOT CHANGED    Details   acetaminophen (TYLENOL) 500 mg tablet Take 1 tablet (500 mg total) by mouth every 6 (six) hours as needed for mild pain, moderate pain or fever, Starting Tue 1/5/2021, Normal      dextromethorphan-guaifenesin (MUCINEX DM)  MG per 12 hr tablet Take 1 tablet by mouth every 12 (twelve) hours as needed for cough, Starting Tue 1/5/2021, Normal      ibuprofen (MOTRIN) 600 mg tablet Take 1 tablet (600 mg total) by mouth every 6 (six) hours as needed for mild pain, moderate pain or fever, Starting Tue 1/5/2021, Normal             Prior to Admission Medications   Prescriptions Last Dose Informant Patient Reported? Taking?   acetaminophen (TYLENOL) 500 mg tablet   No No   Sig: Take 1 tablet (500 mg total) by mouth every 6 (six) hours as needed for mild pain, moderate pain or fever   dextromethorphan-guaifenesin (MUCINEX DM)  MG per 12 hr tablet   No No   Sig: Take 1 tablet by mouth every 12 (twelve) hours as needed for cough   ibuprofen (MOTRIN) 600 mg tablet   No No   Sig: Take 1 tablet (600 mg total) by mouth every 6 (six) hours as needed for mild pain, moderate pain or fever      Facility-Administered Medications: None       History of Present Illness: This is a 39 y o  male presenting today for evaluation of left low back pain  Patient believes he is having kidney pain  He says that he has not had this before  He said that is been going approximately for a week  No urinary symptoms  No increased frequency, dysuria, or any other symptoms  Patient does not have any saddle anesthesia  No trauma  No IV drug use  No loss of sensation or motor sensation      No fever, chills, chest pain, shortness of breath, headache, abdominal pain, nausea, vomiting, diarrhea, rash or any other complaints  Patient notes that they have been eating/drinking normally  - Used    - History obtained from patient and chart   - There are no limitations to the history obtained  - Previous charting was reviewed  Some data reviewed included below for ease of access whether or not it is relevant to this patient encounter  Past Medical, Past Surgical History:    has no past medical history on file  has a past surgical history that includes No past surgeries  Allergies:   No Known Allergies    Social and Family History:     Social History     Substance and Sexual Activity   Alcohol Use Yes    Comment: pt "holiday"     Social History     Tobacco Use   Smoking Status Light Tobacco Smoker    Packs/day: 0 50    Years: 10 00    Pack years: 5 00   Smokeless Tobacco Never Used     Social History     Substance and Sexual Activity   Drug Use No       Review of Systems:   Review of Systems   Constitutional: Negative  Negative for chills and fever  HENT: Negative  Eyes: Negative  Negative for visual disturbance  Respiratory: Negative  Negative for shortness of breath  Cardiovascular: Negative for chest pain  Gastrointestinal: Negative  Negative for abdominal pain, nausea and vomiting  Genitourinary: Positive for flank pain  Negative for difficulty urinating, dysuria and urgency  Musculoskeletal: Positive for back pain  Negative for myalgias  Skin: Negative  Negative for rash  Neurological: Negative  Negative for headaches  Psychiatric/Behavioral: Negative  Negative for self-injury  All other systems reviewed and are negative  Physical Examination     Vitals:    08/18/21 1933 08/18/21 1935   BP: 123/81    Pulse: 80    Resp: 16    Temp:  98 1 °F (36 7 °C)   SpO2: 98%    Weight: 84 8 kg (187 lb)      Vitals reviewed by me  Physical Exam  Vitals and nursing note reviewed     Constitutional:       General: He is not in acute distress  Appearance: He is well-developed  HENT:      Head: Normocephalic and atraumatic  Eyes:      General: No scleral icterus  Cardiovascular:      Rate and Rhythm: Normal rate and regular rhythm  Pulmonary:      Effort: Pulmonary effort is normal       Breath sounds: Normal breath sounds  Abdominal:      General: There is no distension  Palpations: Abdomen is soft  Tenderness: There is no abdominal tenderness  There is no right CVA tenderness, left CVA tenderness, guarding or rebound  Comments: MSK tenderness L low back   Musculoskeletal:         General: Normal range of motion  Cervical back: Normal range of motion and neck supple  Comments: 5/5 LE strength  No ttp midline  Full sensation intact  Ambulating appropriately     Skin:     General: Skin is warm and dry  Neurological:      General: No focal deficit present  Mental Status: He is alert and oriented to person, place, and time  Cranial Nerves: No cranial nerve deficit  Motor: No weakness  Gait: Gait normal       Comments: Grossly neuro intact; Able to move all extremities   Psychiatric:         Mood and Affect: Mood normal                               No orders to display     Orders Placed This Encounter   Procedures    POC Renal US    Comprehensive metabolic panel    Lipase    CBC and differential    Ambulatory Referral to Comprehensive Spine Program       Labs:     Labs Reviewed   COMPREHENSIVE METABOLIC PANEL - Abnormal       Result Value Ref Range Status    Sodium 139  136 - 145 mmol/L Final    Potassium 3 8  3 5 - 5 3 mmol/L Final    Comment: Slightly Hemolyzed;  Results May be Affected    Chloride 109 (*) 100 - 108 mmol/L Final    CO2 25  21 - 32 mmol/L Final    ANION GAP 5  4 - 13 mmol/L Final    BUN 16  5 - 25 mg/dL Final    Creatinine 0 86  0 60 - 1 30 mg/dL Final    Comment: Standardized to IDMS reference method    Glucose 125  65 - 140 mg/dL Final    Comment: If the patient is fasting, the ADA then defines impaired fasting glucose as > 100 mg/dL and diabetes as > or equal to 123 mg/dL  Specimen collection should occur prior to Sulfasalazine administration due to the potential for falsely depressed results  Specimen collection should occur prior to Sulfapyridine administration due to the potential for falsely elevated results  Calcium 7 9 (*) 8 3 - 10 1 mg/dL Final    AST 23  5 - 45 U/L Final    Comment: Slightly Hemolyzed; Results May be Affected  Specimen collection should occur prior to Sulfasalazine administration due to the potential for falsely depressed results  ALT 43  12 - 78 U/L Final    Comment: Specimen collection should occur prior to Sulfasalazine and/or Sulfapyridine administration due to the potential for falsely depressed results  Alkaline Phosphatase 72  46 - 116 U/L Final    Total Protein 7 0  6 4 - 8 2 g/dL Final    Comment: [id number] assay or reaction flag; Manually dilute the sample and rerun on the analyzer  Albumin 3 5  3 5 - 5 0 g/dL Final    Total Bilirubin 0 31  0 20 - 1 00 mg/dL Final    Comment: Use of this assay is not recommended for patients undergoing treatment with eltrombopag due to the potential for falsely elevated results      eGFR 108  ml/min/1 73sq m Final    Narrative:     [place] guidelines for Chronic Kidney Disease (CKD):     Stage 1 with normal or high GFR (GFR > 90 mL/min/1 73 square meters)    Stage 2 Mild CKD (GFR = 60-89 mL/min/1 73 square meters)    Stage 3A Moderate CKD (GFR = 45-59 mL/min/1 73 square meters)    Stage 3B Moderate CKD (GFR = 30-44 mL/min/1 73 square meters)    Stage 4 Severe CKD (GFR = 15-29 mL/min/1 73 square meters)    Stage 5 End Stage CKD (GFR <15 mL/min/1 73 square meters)  Note: GFR calculation is accurate only with a steady state creatinine   LIPASE - Normal    Lipase 140  73 - 393 u/L Final   CBC AND DIFFERENTIAL    WBC 6 41  4 31 - 10 16 Thousand/uL Final RBC 4 71  3 88 - 5 62 Million/uL Final    Hemoglobin 14 9  12 0 - 17 0 g/dL Final    Hematocrit 42 1  36 5 - 49 3 % Final    MCV 89  82 - 98 fL Final    MCH 31 6  26 8 - 34 3 pg Final    MCHC 35 4  31 4 - 37 4 g/dL Final    RDW 13 3  11 6 - 15 1 % Final    MPV 10 0  8 9 - 12 7 fL Final    Platelets 594  916 - 390 Thousands/uL Final    nRBC 0  /100 WBCs Final    Neutrophils Relative 55  43 - 75 % Final    Immat GRANS % 1  0 - 2 % Final    Lymphocytes Relative 31  14 - 44 % Final    Monocytes Relative 7  4 - 12 % Final    Eosinophils Relative 5  0 - 6 % Final    Basophils Relative 1  0 - 1 % Final    Neutrophils Absolute 3 62  1 85 - 7 62 Thousands/µL Final    Immature Grans Absolute 0 05  0 00 - 0 20 Thousand/uL Final    Lymphocytes Absolute 1 96  0 60 - 4 47 Thousands/µL Final    Monocytes Absolute 0 42  0 17 - 1 22 Thousand/µL Final    Eosinophils Absolute 0 30  0 00 - 0 61 Thousand/µL Final    Basophils Absolute 0 06  0 00 - 0 10 Thousands/µL Final   URINE MACROSCOPIC, POC    Color, UA Yellow   Final    Clarity, UA Clear   Final    pH, UA 7 0  4 5 - 8 0 Final    Leukocytes, UA Negative  Negative Final    Nitrite, UA Negative  Negative Final    Protein, UA Negative  Negative mg/dl Final    Glucose, UA Negative  Negative mg/dl Final    Ketones, UA Negative  Negative mg/dl Final    Urobilinogen, UA 1 0  0 2, 1 0 E U /dl E U /dl Final    Bilirubin, UA Negative  Negative Final    Blood, UA Negative  Negative Final    Specific Gravity, UA 1 025  1 003 - 1 030 Final    Narrative:     CLINITEK RESULT       Imaging:   No results found  Final Diagnosis:  1  Low back pain        Code Status: No Order    Portions of the record may have been created with voice recognition software  Occasional wrong word or "sound a like" substitutions may have occurred due to the inherent limitations of voice recognition software  Read the chart carefully and recognize, using context, where substitutions have occurred      Electronically signed by:  Mechelle Bryant, PGY 3, MD Lucho Dove MD  08/19/21 0054